# Patient Record
Sex: MALE | URBAN - METROPOLITAN AREA
[De-identification: names, ages, dates, MRNs, and addresses within clinical notes are randomized per-mention and may not be internally consistent; named-entity substitution may affect disease eponyms.]

---

## 2024-05-22 ENCOUNTER — APPOINTMENT (RX ONLY)
Dept: URBAN - METROPOLITAN AREA CLINIC 73 | Facility: CLINIC | Age: 19
Setting detail: DERMATOLOGY
End: 2024-05-22

## 2024-05-22 DIAGNOSIS — L40.8 OTHER PSORIASIS: ICD-10-CM

## 2024-05-22 DIAGNOSIS — L64.8 OTHER ANDROGENIC ALOPECIA: ICD-10-CM

## 2024-05-22 PROCEDURE — ? ORDER TESTS

## 2024-05-22 PROCEDURE — 99204 OFFICE O/P NEW MOD 45 MIN: CPT

## 2024-05-22 PROCEDURE — ? PATIENT SPECIFIC COUNSELING

## 2024-05-22 PROCEDURE — ? COUNSELING

## 2024-05-22 PROCEDURE — ? TREATMENT REGIMEN

## 2024-05-22 PROCEDURE — ? PRESCRIPTION

## 2024-05-22 RX ORDER — PHARMACY COMPOUNDING ACCESSORY
EACH MISCELLANEOUS
Qty: 120 | Refills: 2 | Status: ERX | COMMUNITY
Start: 2024-05-22

## 2024-05-22 RX ORDER — FLUOCINONIDE 0.5 MG/ML
SOLUTION TOPICAL
Qty: 60 | Refills: 2 | Status: ERX | COMMUNITY
Start: 2024-05-22

## 2024-05-22 RX ADMIN — Medication: at 00:00

## 2024-05-22 RX ADMIN — FLUOCINONIDE: 0.5 SOLUTION TOPICAL at 00:00

## 2024-05-22 ASSESSMENT — LOCATION DETAILED DESCRIPTION DERM
LOCATION DETAILED: LEFT SUPERIOR PARIETAL SCALP
LOCATION DETAILED: LEFT MEDIAL FRONTAL SCALP

## 2024-05-22 ASSESSMENT — LOCATION SIMPLE DESCRIPTION DERM
LOCATION SIMPLE: LEFT SCALP
LOCATION SIMPLE: SCALP

## 2024-05-22 ASSESSMENT — LOCATION ZONE DERM: LOCATION ZONE: SCALP

## 2024-05-22 NOTE — PROCEDURE: PATIENT SPECIFIC COUNSELING
-hair loss for ~3years; see hair loss questionnaire; +family hx\\n-pt reports more thinning at hairline\\n-pt wanting to stick to topicals, disc r/b/sed oral minoxidil vs oral finasteride vs topical minoxidil and finasteride \\n-pt reports irritation w/ OTC minoxidil, advised of this happens w/ compounded soln fluoc soln for seb derm should help resolve (disc r/b/sed)\\n-advised ok to continue rosemary and jojoba oils\\n-pt is a vegetarian, given lab slip today to check labs\\n-pt wanted compound w/ finasteride/minoxidil/ginko/rosemary/jojoba etc - called gracie at [facility] - ok to dispense\\n\\nrtc 3mo
Detail Level: Detailed
-pt denies improvement with clobetasol shampoo, sending fluocinonide soln today instead (disc r/b/sed)\\n-advised to use OTC anti dandruff shampoo TIW

## 2024-05-22 NOTE — PROCEDURE: TREATMENT REGIMEN
Detail Level: Zone
Initiate Treatment: fluocinonide 0.05 % topical solution \\nSig: Apply to affected areas on scalp qhs x 10 days. Prn flares
Initiate Treatment: pharmacy compounding accessory \\nSig: Finasteride 0.025%/minoxidil 5%/ azelaic acid 1.5%/tea tree oil 5%/rosemary oil 0.37%/ ginkgo biloba 0.05%/ biotin 0.01%/melatonin 0.0033%/caffeine 1%/dicolfenac 0.5% solution: apply to scalp qd

## 2024-08-06 ENCOUNTER — APPOINTMENT (RX ONLY)
Dept: URBAN - METROPOLITAN AREA CLINIC 73 | Facility: CLINIC | Age: 19
Setting detail: DERMATOLOGY
End: 2024-08-06

## 2024-08-06 DIAGNOSIS — L64.8 OTHER ANDROGENIC ALOPECIA: ICD-10-CM

## 2024-08-06 DIAGNOSIS — L40.8 OTHER PSORIASIS: ICD-10-CM | Status: IMPROVED

## 2024-08-06 PROCEDURE — ? PATIENT SPECIFIC COUNSELING

## 2024-08-06 PROCEDURE — ? COUNSELING

## 2024-08-06 PROCEDURE — ? TREATMENT REGIMEN

## 2024-08-06 PROCEDURE — 99213 OFFICE O/P EST LOW 20 MIN: CPT

## 2024-08-06 ASSESSMENT — LOCATION DETAILED DESCRIPTION DERM
LOCATION DETAILED: LEFT CENTRAL FRONTAL SCALP
LOCATION DETAILED: RIGHT CENTRAL FRONTAL SCALP
LOCATION DETAILED: LEFT MEDIAL FRONTAL SCALP

## 2024-08-06 ASSESSMENT — LOCATION SIMPLE DESCRIPTION DERM
LOCATION SIMPLE: RIGHT SCALP
LOCATION SIMPLE: LEFT SCALP

## 2024-08-06 ASSESSMENT — LOCATION ZONE DERM: LOCATION ZONE: SCALP

## 2024-08-06 NOTE — PROCEDURE: PATIENT SPECIFIC COUNSELING
- pt had not started topicals yet (had declined orals)\\n-disc low risk of solution compound getting absorbed into the bloodstream and se’s are minimal. \\n- disc scalp massage can help, PRP scalp injections, saw palmetto \\n- oral tx options: oral minoxidil, finasteride, dutasteride\\n- 5/2024 labs all labs wnl except low vit D; TSH sl high normal. Pt was to see pcp if feeling symptomatic. \\n- disc apply solution on qtip or cotton ball and apply to a.a. scalp. Once stabilized on medical treatment, consider PRP prn or can opt for PRP (elective)
Detail Level: Detailed

## 2024-08-06 NOTE — PROCEDURE: TREATMENT REGIMEN
Detail Level: Zone
Otc Regimen: Vitamin D 1000IU
Initiate Treatment: Finasteride 0.025%/minoxidil 5%/ azelaic acid 1.5%/tea tree oil 5%/rosemary oil 0.37%/ ginkgo biloba 0.05%/ biotin 0.01%/melatonin 0.0033%/caffeine 1%/dicolfenac 0.5% solution: apply to scalp qd
Otc Regimen: Ketoconazole shampoo, wash scalp TIW
Continue Regimen: fluocinonide 0.05 % topical solution : Apply to affected areas on scalp qhs x 10 days. Prn flares